# Patient Record
Sex: FEMALE | Race: WHITE | Employment: FULL TIME | URBAN - METROPOLITAN AREA
[De-identification: names, ages, dates, MRNs, and addresses within clinical notes are randomized per-mention and may not be internally consistent; named-entity substitution may affect disease eponyms.]

---

## 2021-01-06 ENCOUNTER — ORDER TRANSCRIPTION (OUTPATIENT)
Dept: PHYSICAL THERAPY | Facility: HOSPITAL | Age: 61
End: 2021-01-06

## 2021-01-07 ENCOUNTER — ORDER TRANSCRIPTION (OUTPATIENT)
Dept: PHYSICAL THERAPY | Facility: HOSPITAL | Age: 61
End: 2021-01-07

## 2021-01-07 DIAGNOSIS — M25.562 LEFT KNEE PAIN: Primary | ICD-10-CM

## 2021-01-07 DIAGNOSIS — R42 DIZZINESS AND GIDDINESS: Primary | ICD-10-CM

## 2021-01-07 DIAGNOSIS — M25.561 RIGHT KNEE PAIN: ICD-10-CM

## 2021-01-12 ENCOUNTER — OFFICE VISIT (OUTPATIENT)
Dept: PHYSICAL THERAPY | Age: 61
End: 2021-01-12
Attending: FAMILY MEDICINE
Payer: COMMERCIAL

## 2021-01-12 DIAGNOSIS — M25.561 RIGHT KNEE PAIN: ICD-10-CM

## 2021-01-12 DIAGNOSIS — M25.562 LEFT KNEE PAIN: ICD-10-CM

## 2021-01-12 PROCEDURE — 97110 THERAPEUTIC EXERCISES: CPT | Performed by: PHYSICAL THERAPIST

## 2021-01-12 PROCEDURE — 97161 PT EVAL LOW COMPLEX 20 MIN: CPT | Performed by: PHYSICAL THERAPIST

## 2021-01-12 NOTE — PROGRESS NOTES
KNEE  EVALUATION:    Referring Physician: Bernardino Chase    DX Code:  Left knee pain (M25.562)  Right knee pain (M25.561)     PT DX:  Left knee pain (M25.562)  Right knee pain (M25.561)    PCP:     Age: 61 Occupation: adm ass    DOI: 6 weeks  DOS: - treatment limitation: None    Plan of Care:  · Treatment will focus on the following goals:       Long term goals to be reached in 8-12 visits.   · Pt. will report decreased pain from 6/10 to 2/10 at worst.  · Increase passive range of motion of left knee f

## 2021-01-14 ENCOUNTER — OFFICE VISIT (OUTPATIENT)
Dept: PHYSICAL THERAPY | Age: 61
End: 2021-01-14
Attending: FAMILY MEDICINE
Payer: COMMERCIAL

## 2021-01-14 DIAGNOSIS — M25.561 RIGHT KNEE PAIN: ICD-10-CM

## 2021-01-14 DIAGNOSIS — M25.562 LEFT KNEE PAIN: ICD-10-CM

## 2021-01-14 PROCEDURE — 97140 MANUAL THERAPY 1/> REGIONS: CPT | Performed by: PHYSICAL THERAPIST

## 2021-01-14 PROCEDURE — 97110 THERAPEUTIC EXERCISES: CPT | Performed by: PHYSICAL THERAPIST

## 2021-01-14 NOTE — PROGRESS NOTES
Dx: Left knee pain (M25.562)  Right knee pain (M25.561)         Authorized # of Visits:  12         Next MD visit: none scheduled  Fall Risk: standard         Precautions: n/a           Medication Changes since last visit?: No  Subjective: doing HEP, PKB a

## 2021-01-19 ENCOUNTER — OFFICE VISIT (OUTPATIENT)
Dept: PHYSICAL THERAPY | Age: 61
End: 2021-01-19
Attending: FAMILY MEDICINE
Payer: COMMERCIAL

## 2021-01-19 PROCEDURE — 97110 THERAPEUTIC EXERCISES: CPT | Performed by: PHYSICAL THERAPIST

## 2021-01-19 PROCEDURE — 97140 MANUAL THERAPY 1/> REGIONS: CPT | Performed by: PHYSICAL THERAPIST

## 2021-01-19 NOTE — PROGRESS NOTES
Dx: Left knee pain (M25.562)  Right knee pain (M25.561)         Authorized # of Visits:  12         Next MD visit: none scheduled  Fall Risk: standard         Precautions: n/a           Medication Changes since last visit?: No     Subjective: doing HEP, st Progress activity as tolerated toward pain free function.     Skilled Services: TE, MT    Charges: TE2, MT1       Total Timed Treatment: 44 min  Total Treatment Time: 44 min

## 2021-01-21 ENCOUNTER — TELEPHONE (OUTPATIENT)
Dept: PHYSICAL THERAPY | Facility: HOSPITAL | Age: 61
End: 2021-01-21

## 2021-01-21 ENCOUNTER — APPOINTMENT (OUTPATIENT)
Dept: PHYSICAL THERAPY | Age: 61
End: 2021-01-21
Attending: FAMILY MEDICINE
Payer: COMMERCIAL

## 2021-01-22 ENCOUNTER — OFFICE VISIT (OUTPATIENT)
Dept: PHYSICAL THERAPY | Age: 61
End: 2021-01-22
Attending: FAMILY MEDICINE
Payer: COMMERCIAL

## 2021-01-22 PROCEDURE — 97140 MANUAL THERAPY 1/> REGIONS: CPT | Performed by: PHYSICAL THERAPIST

## 2021-01-22 PROCEDURE — 97110 THERAPEUTIC EXERCISES: CPT | Performed by: PHYSICAL THERAPIST

## 2021-01-22 NOTE — PROGRESS NOTES
Dx: Left knee pain (M25.562)  Right knee pain (M25.561)         Authorized # of Visits:  12         Next MD visit: none scheduled  Fall Risk: standard         Precautions: n/a           Medication Changes since last visit?: No     Subjective: doing HEP, so squat and negotiate stairs pain free. · LEISURE:  Pt will be able to return to previous level of function for leisure activities  · Pt. will be able to perform ADLs with no pain. · Pt. will be independent with home exercise program and self management.

## 2021-01-26 ENCOUNTER — OFFICE VISIT (OUTPATIENT)
Dept: PHYSICAL THERAPY | Age: 61
End: 2021-01-26
Attending: FAMILY MEDICINE
Payer: COMMERCIAL

## 2021-01-26 PROCEDURE — 97110 THERAPEUTIC EXERCISES: CPT | Performed by: PHYSICAL THERAPIST

## 2021-01-26 NOTE — PROGRESS NOTES
Dx: Left knee pain (M25.562)  Right knee pain (M25.561)         Authorized # of Visits:  12         Next MD visit: none scheduled  Fall Risk: standard         Precautions: n/a           Medication Changes since last visit?: No     Subjective: felt sore in has no discomfort with walking, only tightness with knee flexion. Goals:    to be reached in 8-12 visits. · Pt. will report decreased pain from 6/10 to 2/10 at worst.  · Increase passive range of motion of left knee flexion to 140 degrees.     · Incre

## 2021-01-28 ENCOUNTER — OFFICE VISIT (OUTPATIENT)
Dept: PHYSICAL THERAPY | Age: 61
End: 2021-01-28
Attending: FAMILY MEDICINE
Payer: COMMERCIAL

## 2021-01-28 PROCEDURE — 97110 THERAPEUTIC EXERCISES: CPT | Performed by: PHYSICAL THERAPIST

## 2021-01-28 NOTE — PROGRESS NOTES
Dx: Left knee pain (M25.562)  Right knee pain (M25.561)         Authorized # of Visits:  12         Next MD visit: none scheduled  Fall Risk: standard         Precautions: n/a           Medication Changes since last visit?: No     Subjective: felt less sor tendon x 5 min Man STM to L pat tendon x 5 min Man STM to L pat tendon x 5 min - Shuttle squats 4 bands x 30         Shuttle heel raises 4 bands 3x10         Assessment: tolerates increased activity well.  L knee has no discomfort with walking, no subjectiv

## 2021-02-02 ENCOUNTER — OFFICE VISIT (OUTPATIENT)
Dept: PHYSICAL THERAPY | Age: 61
End: 2021-02-02
Attending: FAMILY MEDICINE
Payer: COMMERCIAL

## 2021-02-02 PROCEDURE — 97110 THERAPEUTIC EXERCISES: CPT | Performed by: PHYSICAL THERAPIST

## 2021-02-02 NOTE — PROGRESS NOTES
Dx: Left knee pain (M25.562)  Right knee pain (M25.561)         Authorized # of Visits:  12         Next MD visit: none scheduled  Fall Risk: standard         Precautions: n/a           Medication Changes since last visit?: No     Subjective: felt less sor Prone hip ext x 10 ea Prone hip ext 2x 10 ea Prone hip ext 2x 10 ea Prone hip ext 2x 10 ea -     Man PKB mobs gr 3 x 8 min Man PKB mobs gr 3 x 8 min Man PKB mobs gr 3 x 8 min Man PKB mobs gr 3 x 8 min -     Man STM to L pat tendon x 5 min Man STM to L pa

## 2021-02-04 ENCOUNTER — OFFICE VISIT (OUTPATIENT)
Dept: PHYSICAL THERAPY | Age: 61
End: 2021-02-04
Attending: FAMILY MEDICINE
Payer: COMMERCIAL

## 2021-02-04 PROCEDURE — 97140 MANUAL THERAPY 1/> REGIONS: CPT | Performed by: PHYSICAL THERAPIST

## 2021-02-04 PROCEDURE — 97110 THERAPEUTIC EXERCISES: CPT | Performed by: PHYSICAL THERAPIST

## 2021-02-04 NOTE — PROGRESS NOTES
Dx: Left knee pain (M25.562)  Right knee pain (M25.561)         Authorized # of Visits:  12         Next MD visit: none scheduled  Fall Risk: standard         Precautions: n/a           Medication Changes since last visit?: No     Subjective: stronger, did min ea Supine man knee ext/flex x 1 min ea       Clamshell 3x10 ea Clamshell 3x10 ea Clamshell 3x10 ea   PKB x 20 ea PKB x 20 ea PKB x 20 ea PKB x 20 ea -     Prone hip ext x 10 ea Prone hip ext 2x 10 ea Prone hip ext 2x 10 ea Prone hip ext 2x 10 ea -

## 2021-02-09 ENCOUNTER — OFFICE VISIT (OUTPATIENT)
Dept: PHYSICAL THERAPY | Age: 61
End: 2021-02-09
Attending: FAMILY MEDICINE
Payer: COMMERCIAL

## 2021-02-09 PROCEDURE — 97110 THERAPEUTIC EXERCISES: CPT | Performed by: PHYSICAL THERAPIST

## 2021-02-09 NOTE — PROGRESS NOTES
Dx: Left knee pain (M25.562)  Right knee pain (M25.561)         Authorized # of Visits:  12         Next MD visit: none scheduled  Fall Risk: standard         Precautions: n/a           Medication Changes since last visit?: No     Subjective: stronger, wit 2x 10 ea -        Man PKB mobs gr 3 x 8 min Man PKB mobs gr 3 x 8 min -        Man STM to L pat tendon x 5 min - Shuttle squats 4 bands x 30 Shuttle squats 4 bands x 30 Shuttle squats 5 bands x 30 Shuttle squats 6 bands x 30       Shuttle heel raises 4 ban

## 2021-02-16 ENCOUNTER — APPOINTMENT (OUTPATIENT)
Dept: PHYSICAL THERAPY | Age: 61
End: 2021-02-16
Attending: FAMILY MEDICINE
Payer: COMMERCIAL

## 2021-02-17 ENCOUNTER — OFFICE VISIT (OUTPATIENT)
Dept: PHYSICAL THERAPY | Age: 61
End: 2021-02-17
Attending: FAMILY MEDICINE
Payer: COMMERCIAL

## 2021-02-17 PROCEDURE — 97110 THERAPEUTIC EXERCISES: CPT | Performed by: PHYSICAL THERAPIST

## 2021-02-17 NOTE — PROGRESS NOTES
Dx: Left knee pain (M25.562)  Right knee pain (M25.561)         Authorized # of Visits:  12         Next MD visit: none scheduled  Fall Risk: standard         Precautions: n/a           Medication Changes since last visit?: No     Subjective: stronger, fee ext/flex x 1 min ea Supine man knee ext/flex x 1 min ea Supine man knee ext/flex x 1 min ea Supine man knee ext/flex x 1 min ea Supine man knee ext/flex x 1 min ea      Clamshell 3x10 ea Clamshell 3x10 ea Clamshell 3x10 ea Clamshell 3x10 ea Clamshell 3x10

## 2021-02-23 ENCOUNTER — OFFICE VISIT (OUTPATIENT)
Dept: PHYSICAL THERAPY | Age: 61
End: 2021-02-23
Attending: FAMILY MEDICINE
Payer: COMMERCIAL

## 2021-02-23 PROCEDURE — 97110 THERAPEUTIC EXERCISES: CPT | Performed by: PHYSICAL THERAPIST

## 2021-02-23 NOTE — PROGRESS NOTES
Dx: Left knee pain (M25.562)  Right knee pain (M25.561)         Authorized # of Visits:  12         Next MD visit: none scheduled  Fall Risk: standard         Precautions: n/a           Medication Changes since last visit?: No     Subjective: stronger, fee ea Supine SLR w quad set 3x 10 ea Supine SLR w quad set 3x 10 ea Supine SLR w quad set 3x 10 ea Supine SLR w quad set 3x 10 ea   Supine man knee ext/flex x 1 min ea Supine man knee ext/flex x 1 min ea Supine man knee ext/flex x 1 min ea Supine man knee ext

## 2021-03-02 ENCOUNTER — OFFICE VISIT (OUTPATIENT)
Dept: PHYSICAL THERAPY | Age: 61
End: 2021-03-02
Attending: FAMILY MEDICINE
Payer: COMMERCIAL

## 2021-03-02 PROCEDURE — 97110 THERAPEUTIC EXERCISES: CPT | Performed by: PHYSICAL THERAPIST

## 2021-03-02 NOTE — PROGRESS NOTES
Dx: Left knee pain (M25.562)  Right knee pain (M25.561)         Authorized # of Visits:  12         Next MD visit: none scheduled  Fall Risk: standard         Precautions: n/a           Medication Changes since last visit?: No     Subjective: stronger, fee man knee ext/flex x 1 min ea Supine man knee ext/flex x 1 min ea Supine man knee ext/flex x 1 min ea Supine man knee ext/flex x 1 min ea Supine man knee ext/flex x 1 min ea   Clamshell 3x10 ea Clamshell 3x10 ea Clamshell 3x10 ea Clamshell 3x10 ea Clamshell

## 2021-03-03 NOTE — PROGRESS NOTES
Progress Summary  Pt has attended 12 visits in Physical Therapy. Subjective: pt feels 98% better, still not fully confident she can independently manage her symptoms in light of upcoming trip which will involve hiking.     Assessment: good progress ma

## 2021-03-23 ENCOUNTER — TELEPHONE (OUTPATIENT)
Dept: PHYSICAL THERAPY | Age: 61
End: 2021-03-23

## 2021-03-31 ENCOUNTER — OFFICE VISIT (OUTPATIENT)
Dept: PHYSICAL THERAPY | Age: 61
End: 2021-03-31
Attending: INTERNAL MEDICINE
Payer: COMMERCIAL

## 2021-03-31 PROCEDURE — 97110 THERAPEUTIC EXERCISES: CPT | Performed by: PHYSICAL THERAPIST

## 2021-03-31 NOTE — PROGRESS NOTES
Dx: Left knee pain (M25.562)  Right knee pain (M25.561)         Authorized # of Visits:  12         Next MD visit: none scheduled  Fall Risk: standard         Precautions: n/a           Medication Changes since last visit?: No     Subjective: feeling gener quad set 3x 10 ea Supine SLR w quad set 3x 10 ea Supine SLR w quad set 3x 10 ea     Supine man knee ext/flex x 1 min ea Supine man knee ext/flex x 1 min ea Supine man knee ext/flex x 1 min ea Supine man knee ext/flex x 1 min ea Supine man knee ext/flex x 1 TE3       Total Timed Treatment: 42 min  Total Treatment Time: 46 min

## 2021-04-08 ENCOUNTER — OFFICE VISIT (OUTPATIENT)
Dept: PHYSICAL THERAPY | Age: 61
End: 2021-04-08
Attending: FAMILY MEDICINE
Payer: COMMERCIAL

## 2021-04-08 PROCEDURE — 97110 THERAPEUTIC EXERCISES: CPT | Performed by: PHYSICAL THERAPIST

## 2021-04-08 NOTE — PROGRESS NOTES
Dx: Left knee pain (M25.562)  Right knee pain (M25.561)         Authorized # of Visits:  12         Next MD visit: none scheduled  Fall Risk: standard         Precautions: n/a           Medication Changes since last visit?: No     Subjective: feeling gener Supine bridge sw ball 3x10 Supine bridge sw ball 3x10 Supine bridge sw ball 3x10    Supine SLR w quad set 3x 10 ea Supine SLR w quad set 3x 10 ea Supine SLR w quad set 3x 10 ea Supine SLR w quad set 3x 10 ea Supine SLR w quad set 3x 10 ea Supine SLR w quad be able to return to previous level of function for leisure activities  · Pt. will be able to perform ADLs with no pain. · Pt. will be independent with home exercise program and self management.     Plan: pt to add self prone quad stretches with belt, retu

## 2021-04-15 ENCOUNTER — OFFICE VISIT (OUTPATIENT)
Dept: PHYSICAL THERAPY | Age: 61
End: 2021-04-15
Attending: FAMILY MEDICINE
Payer: COMMERCIAL

## 2021-04-15 PROCEDURE — 97110 THERAPEUTIC EXERCISES: CPT | Performed by: PHYSICAL THERAPIST

## 2021-04-15 NOTE — PROGRESS NOTES
DISCHARGE NOTE    Dx: Left knee pain (M25.562)  Right knee pain (M25.561)         Authorized # of Visits:  12         Next MD visit: none scheduled  Fall Risk: standard         Precautions: n/a           Medication Changes since last visit?: No     Subject Supine bridge sw ball 3x10 Supine bridge sw ball 3x10 Supine bridge sw ball 3x10 Supine bridge sw ball 3x10 Supine bridge sw ball 3x10 Supine bridge sw ball 3x10 Supine bridge sw ball 3x10 Supine bridge sw ball 3x10 Supine bridge sw ball 3x10 Supine brid doing well with self management. Goals:    to be reached in 8-12 visits. · Pt. will report decreased pain from 6/10 to 2/10 at worst. - MET 4/8/21  · Increase passive range of motion of left knee flexion to 140 degrees.  - MET 1/28/21  · Increase streng